# Patient Record
Sex: FEMALE | Race: ASIAN | Employment: FULL TIME | URBAN - METROPOLITAN AREA
[De-identification: names, ages, dates, MRNs, and addresses within clinical notes are randomized per-mention and may not be internally consistent; named-entity substitution may affect disease eponyms.]

---

## 2020-10-10 ENCOUNTER — NURSE TRIAGE (OUTPATIENT)
Dept: NURSING | Facility: CLINIC | Age: 40
End: 2020-10-10

## 2020-10-10 NOTE — TELEPHONE ENCOUNTER
Ashley calls and says that she is having discomfort with urination. Pt. Says that she also has lower abdominal pain. Pt. Does not want to go to an  but wants to get an appointment for Monday, at her . RN then called   who says that the [facility] makes their own appointments. Pt. Says that she will call her clinic on Monday. COVID 19 Nurse Triage Plan/Patient Instructions    Please be aware that novel coronavirus (COVID-19) may be circulating in the community. If you develop symptoms such as fever, cough, or SOB or if you have concerns about the presence of another infection including coronavirus (COVID-19), please contact your health care provider or visit [email or web address].     Disposition/Instructions    In-Person Visit with provider recommended. Reference Visit Selection Guide.    Thank you for taking steps to prevent the spread of this virus.  o Limit your contact with others.  o Wear a simple mask to cover your cough.  o Wash your hands well and often.    Resources    [facility]: About COVID-19: [email or web address]    CDC: What to Do If You're Sick: www.cdc.gov/coronavirus/2019-ncov/about/steps-when-sick.html    CDC: Ending Home Isolation: www.cdc.gov/coronavirus/2019-ncov/hcp/disposition-in-home-patients.html     CDC: Caring for Someone: www.cdc.gov/coronavirus/2019-ncov/if-you-are-sick/care-for-someone.html     [facility]: Interim Guidance for Hospital Discharge to Home: www.health.[facility].[place]/diseases/coronavirus/hcp/hospdischarge.pdf    [facility] clinical trials (COVID-19 research studies): [email or web address].[email or web address]-clinical-trials     Below are the COVID-19 hotlines at the [facility] ([facility]). Interpreters are available.   o For health questions: Call 133-711-1624 or 1-558.332.3817 (7 a.m. to 7 p.m.)  o For questions about schools and childcare: Call 508-732-3730 or 1-265.274.9958 (7 a.m. to 7 p.m.)                     Additional  Information    Negative: Shock suspected (e.g., cold/pale/clammy skin, too weak to stand, low BP, rapid pulse)    Negative: Sounds like a life-threatening emergency to the triager    Negative: Followed a genital area injury    Negative: Taking antibiotic for urinary tract infection (UTI)    Negative: Pregnant    Negative: Postpartum (from 0 to 6 weeks after delivery)    Negative: [1] Unable to urinate (or only a few drops) > 4 hours AND [2] bladder feels very full (e.g., palpable bladder or strong urge to urinate)    Negative: Vomiting    Negative: Patient sounds very sick or weak to the triager    Negative: [1] SEVERE pain with urination  (e.g., excruciating) AND [2] not improved after 2 hours of pain medicine and Sitz bath    Negative: Fever > 100.5 F (38.1 C)    Negative: Side (flank) or lower back pain present    Negative: Diabetes mellitus or weak immune system (e.g., HIV positive, cancer chemo, splenectomy, organ transplant, chronic steroids)    Negative: Bedridden (e.g., nursing home patient, CVA, chronic illness, recovering from surgery)    Negative: Artificial heart valve or artificial joint    Negative: Unusual vaginal discharge (e.g., bad smelling, yellow, green, or foamy-white)    Negative: Patient is worried about sexually transmitted disease (STD)    Negative: Possibility of pregnancy    Negative: Blood in urine (red, pink, or tea-colored)    Negative: Age > 50 years    Negative: > 2 UTI's in last year    All other patients with painful urination(Exception: [1] EITHER frequency or urgency AND [2] has on-call doctor)    Protocols used: URINATION PAIN - FEMALE-A-

## 2020-10-12 ENCOUNTER — VIRTUAL VISIT (OUTPATIENT)
Dept: FAMILY MEDICINE | Facility: CLINIC | Age: 40
End: 2020-10-12
Payer: COMMERCIAL

## 2020-10-12 DIAGNOSIS — R35.0 URINARY FREQUENCY: ICD-10-CM

## 2020-10-12 DIAGNOSIS — R30.0 BURNING WITH URINATION: ICD-10-CM

## 2020-10-12 DIAGNOSIS — R53.83 OTHER FATIGUE: ICD-10-CM

## 2020-10-12 DIAGNOSIS — Z86.2 HISTORY OF ANEMIA: ICD-10-CM

## 2020-10-12 DIAGNOSIS — Z20.2 POTENTIAL EXPOSURE TO STD: ICD-10-CM

## 2020-10-12 DIAGNOSIS — Z20.2 POTENTIAL EXPOSURE TO STD: Primary | ICD-10-CM

## 2020-10-12 LAB
ALBUMIN SERPL-MCNC: 4.2 G/DL (ref 3.4–5)
ALP SERPL-CCNC: 62 U/L (ref 40–150)
ALT SERPL W P-5'-P-CCNC: 17 U/L (ref 0–50)
ANION GAP SERPL CALCULATED.3IONS-SCNC: 5 MMOL/L (ref 3–14)
AST SERPL W P-5'-P-CCNC: 10 U/L (ref 0–45)
BASOPHILS # BLD AUTO: 0 10E9/L (ref 0–0.2)
BASOPHILS NFR BLD AUTO: 0.2 %
BILIRUB SERPL-MCNC: 0.4 MG/DL (ref 0.2–1.3)
BILIRUBIN UR: NEGATIVE MG/DL
BLOOD UR: NORMAL MG/DL
BUN SERPL-MCNC: 13 MG/DL (ref 7–30)
CALCIUM SERPL-MCNC: 9.2 MG/DL (ref 8.5–10.1)
CHLORIDE SERPL-SCNC: 107 MMOL/L (ref 94–109)
CLARITY, URINE: CLEAR
CO2 SERPL-SCNC: 27 MMOL/L (ref 20–32)
COLOR UR: YELLOW
CREAT SERPL-MCNC: 0.71 MG/DL (ref 0.52–1.04)
DEPRECATED CALCIDIOL+CALCIFEROL SERPL-MC: 70 UG/L (ref 20–75)
DIFFERENTIAL METHOD BLD: ABNORMAL
EOSINOPHIL # BLD AUTO: 0 10E9/L (ref 0–0.7)
EOSINOPHIL NFR BLD AUTO: 0.4 %
ERYTHROCYTE [DISTWIDTH] IN BLOOD BY AUTOMATED COUNT: 14.1 % (ref 10–15)
GFR SERPL CREATININE-BSD FRML MDRD: >90 ML/MIN/{1.73_M2}
GLUCOSE SERPL-MCNC: 84 MG/DL (ref 70–99)
GLUCOSE URINE: NEGATIVE
HCT VFR BLD AUTO: 41.1 % (ref 35–47)
HGB BLD-MCNC: 12.7 G/DL (ref 11.7–15.7)
HIV 1+2 AB+HIV1 P24 AG SERPL QL IA: NONREACTIVE
IMM GRANULOCYTES # BLD: 0 10E9/L (ref 0–0.4)
IMM GRANULOCYTES NFR BLD: 0.4 %
KETONES UR QL: NEGATIVE MG/DL
LEUKOCYTE ESTERASE UR: NEGATIVE
LYMPHOCYTES # BLD AUTO: 1.2 10E9/L (ref 0.8–5.3)
LYMPHOCYTES NFR BLD AUTO: 26.5 %
MCH RBC QN AUTO: 28.5 PG (ref 26.5–33)
MCHC RBC AUTO-ENTMCNC: 30.9 G/DL (ref 31.5–36.5)
MCV RBC AUTO: 92 FL (ref 78–100)
MONOCYTES # BLD AUTO: 0.3 10E9/L (ref 0–1.3)
MONOCYTES NFR BLD AUTO: 6.7 %
NEUTROPHILS # BLD AUTO: 3 10E9/L (ref 1.6–8.3)
NEUTROPHILS NFR BLD AUTO: 65.8 %
NITRITE UR QL STRIP: NEGATIVE MG/DL
NRBC # BLD AUTO: 0 10*3/UL
NRBC BLD AUTO-RTO: 0 /100
PH UR STRIP: 6 [PH] (ref 4.5–8)
PLATELET # BLD AUTO: 318 10E9/L (ref 150–450)
POTASSIUM SERPL-SCNC: 3.8 MMOL/L (ref 3.4–5.3)
PROT SERPL-MCNC: 8.5 G/DL (ref 6.8–8.8)
PROTEIN UR: NEGATIVE MG/DL
RBC # BLD AUTO: 4.46 10E12/L (ref 3.8–5.2)
SODIUM SERPL-SCNC: 140 MMOL/L (ref 133–144)
SP GR UR STRIP: <=1.005 (ref 1–1)
TSH SERPL DL<=0.005 MIU/L-ACNC: 0.63 MU/L (ref 0.4–4)
UROBILINOGEN UR STRIP-ACNC: NORMAL E.U./DL
WBC # BLD AUTO: 4.6 10E9/L (ref 4–11)

## 2020-10-12 NOTE — PROGRESS NOTES
"Ashley Torres is a 40 year old female who is being evaluated via a billable telephone visit.      The patient has been notified of following:     \"This telephone visit will be conducted via a call between you and your physician/provider. We have found that certain health care needs can be provided without the need for a physical exam.  This service lets us provide the care you need with a short phone conversation.  If a prescription is necessary we can send it directly to your pharmacy.  If lab work is needed we can place an order for that and you can then stop by our lab to have the test done at a later time.    Telephone visits are billed at different rates depending on your insurance coverage. During this emergency period, for some insurers they may be billed the same as an in-person visit.  Please reach out to your insurance provider with any questions.    If during the course of the call the physician/provider feels a telephone visit is not appropriate, you will not be charged for this service.\"    Patient has given verbal consent for Telephone visit?  Yes    What phone number would you like to be contacted at? [phone]    How would you like to obtain your AVS? Barrett Lo     Ashley Torres is a 40 year old female who presents via phone visit today for the following health issues:  She would like to establish care and have some lab work done.  She moved to [place] from [place] and [place].  She originally thought she was being directed to the [facility] because it was close to her house.  She does not care if she is here or there, she just wants to establish care close to home.    She is experiencing urinary frequency, malodorous urine, no pain with urination, perhaps a slight burning at times.  She would like std/sti testing as well.  She has been to an allergist in the recent past due to hives, she has a history of anemia, vitamin d deficiency.  She was told by someone in her family who is \"in " "the medical field\" that it sounds like she may have issues with her thyroid as well, with some of her fatigue-type symptoms.    She sounded very concerned about std/sti issues, she was whispering on the phone and was having a hard time talking about it, but did want testing.  She states she is safe.    Review of Systems   CONSTITUTIONAL: NEGATIVE for fever, chills, change in weight  ENT/MOUTH: NEGATIVE for ear, mouth and throat problems  RESP: NEGATIVE for significant cough or SOB  CV: NEGATIVE for chest pain, palpitations or peripheral edema    Objective      Vitals:  No vitals were obtained today due to virtual visit.    healthy, alert and no distress  PSYCH: Alert and oriented times 3; coherent speech, normal   rate and volume, able to articulate logical thoughts, able   to abstract reason, no tangential thoughts, no hallucinations   or delusions  Her affect is anxious  RESP: No cough, no audible wheezing, able to talk in full sentences  Remainder of exam unable to be completed due to telephone visits    Assessment/Plan:    1. Potential exposure to STD    - Treponema Abs w Reflex to RPR and Titer; Future  - HIV Antigen Antibody Combo; Future  - NEISSERIA GONORRHOEA PCR; Future  - CHLAMYDIA TRACHOMATIS PCR; Future    2. Urinary frequency    - Urinalysis, Micro If (UA) ([facility]); Future    3. Burning with urination    - Urinalysis, Micro If (UA) ([facility]); Future    4. History of anemia    - CBC with platelets differential; Future  - Comprehensive metabolic panel; Future    5. Other fatigue    - Vitamin D Level; Future  - TSH with free T4 reflex; Future  - Comprehensive metabolic panel; Future    I will follow up with Ashley when results are back for her labs.      Phone call duration:  15 minutes              "

## 2020-10-12 NOTE — NURSING NOTE
40 year old  No chief complaint on file.      There were no vitals taken for this visit. There is no height or weight on file to calculate BMI.  BP completed using cuff size:    There is no problem list on file for this patient.      Wt Readings from Last 2 Encounters:   No data found for Wt     BP Readings from Last 3 Encounters:   No data found for BP       No Known Allergies    Current Outpatient Medications   Medication     VITAMIN D, ERGOCALCIFEROL, PO     No current facility-administered medications for this visit.        Social History     Tobacco Use     Smoking status: Never Smoker     Smokeless tobacco: Never Used   Substance Use Topics     Alcohol use: None     Drug use: None       Honoring Choices - Health Care Directive Guide offered to patient at time of visit.    Health Maintenance Due   Topic Date Due     PREVENTIVE CARE VISIT  1982     HIV SCREENING  01/01/1997     PAP  01/01/2003     DTAP/TDAP/TD IMMUNIZATION (1 - Tdap) 01/01/2007     INFLUENZA VACCINE (1) 09/01/2020         There is no immunization history on file for this patient.    No results found for: PAP      No lab results found.    PHQ-2 ( 1999 Pfizer) 10/12/2020   Q1: Little interest or pleasure in doing things 0   Q2: Feeling down, depressed or hopeless 0   PHQ-2 Score 0       No flowsheet data found.    No flowsheet data found.    No flowsheet data found.        Tami Daniel CMA  October 12, 2020 1:25 PM

## 2020-10-13 LAB
C TRACH DNA SPEC QL NAA+PROBE: NEGATIVE
N GONORRHOEA DNA SPEC QL NAA+PROBE: NEGATIVE
SPECIMEN SOURCE: NORMAL
SPECIMEN SOURCE: NORMAL

## 2020-10-14 LAB — T PALLIDUM AB SER QL: NONREACTIVE

## 2020-10-16 ENCOUNTER — TELEPHONE (OUTPATIENT)
Dept: FAMILY MEDICINE | Facility: CLINIC | Age: 40
End: 2020-10-16

## 2020-10-16 NOTE — TELEPHONE ENCOUNTER
Patient states she has some questions about her results and would like Ann to give her a call back.     Romy Patiño, CMA

## 2021-01-09 ENCOUNTER — HEALTH MAINTENANCE LETTER (OUTPATIENT)
Age: 41
End: 2021-01-09

## 2021-10-11 ENCOUNTER — HEALTH MAINTENANCE LETTER (OUTPATIENT)
Age: 41
End: 2021-10-11

## 2022-01-30 ENCOUNTER — HEALTH MAINTENANCE LETTER (OUTPATIENT)
Age: 42
End: 2022-01-30

## 2022-09-24 ENCOUNTER — HEALTH MAINTENANCE LETTER (OUTPATIENT)
Age: 42
End: 2022-09-24

## 2023-05-08 ENCOUNTER — HEALTH MAINTENANCE LETTER (OUTPATIENT)
Age: 43
End: 2023-05-08

## 2024-03-02 ENCOUNTER — HEALTH MAINTENANCE LETTER (OUTPATIENT)
Age: 44
End: 2024-03-02